# Patient Record
Sex: FEMALE | Race: WHITE | Employment: UNEMPLOYED | URBAN - METROPOLITAN AREA
[De-identification: names, ages, dates, MRNs, and addresses within clinical notes are randomized per-mention and may not be internally consistent; named-entity substitution may affect disease eponyms.]

---

## 2017-10-05 ENCOUNTER — OFFICE VISIT (OUTPATIENT)
Dept: ORTHOPEDIC SURGERY | Age: 77
End: 2017-10-05

## 2017-10-05 VITALS
HEIGHT: 59 IN | BODY MASS INDEX: 26.41 KG/M2 | HEART RATE: 91 BPM | SYSTOLIC BLOOD PRESSURE: 146 MMHG | WEIGHT: 131 LBS | DIASTOLIC BLOOD PRESSURE: 86 MMHG

## 2017-10-05 DIAGNOSIS — M25.511 RIGHT SHOULDER PAIN, UNSPECIFIED CHRONICITY: ICD-10-CM

## 2017-10-05 DIAGNOSIS — M75.101 ROTATOR CUFF TEAR ARTHROPATHY, RIGHT: Primary | ICD-10-CM

## 2017-10-05 DIAGNOSIS — M12.811 ROTATOR CUFF TEAR ARTHROPATHY, RIGHT: Primary | ICD-10-CM

## 2017-10-05 PROCEDURE — 1123F ACP DISCUSS/DSCN MKR DOCD: CPT | Performed by: ORTHOPAEDIC SURGERY

## 2017-10-05 PROCEDURE — 1090F PRES/ABSN URINE INCON ASSESS: CPT | Performed by: ORTHOPAEDIC SURGERY

## 2017-10-05 PROCEDURE — 99203 OFFICE O/P NEW LOW 30 MIN: CPT | Performed by: ORTHOPAEDIC SURGERY

## 2017-10-05 PROCEDURE — 1036F TOBACCO NON-USER: CPT | Performed by: ORTHOPAEDIC SURGERY

## 2017-10-05 PROCEDURE — G8427 DOCREV CUR MEDS BY ELIG CLIN: HCPCS | Performed by: ORTHOPAEDIC SURGERY

## 2017-10-05 PROCEDURE — 4040F PNEUMOC VAC/ADMIN/RCVD: CPT | Performed by: ORTHOPAEDIC SURGERY

## 2017-10-05 PROCEDURE — G8484 FLU IMMUNIZE NO ADMIN: HCPCS | Performed by: ORTHOPAEDIC SURGERY

## 2017-10-05 PROCEDURE — G8400 PT W/DXA NO RESULTS DOC: HCPCS | Performed by: ORTHOPAEDIC SURGERY

## 2017-10-05 PROCEDURE — G8419 CALC BMI OUT NRM PARAM NOF/U: HCPCS | Performed by: ORTHOPAEDIC SURGERY

## 2017-10-05 RX ORDER — MONTELUKAST SODIUM 10 MG/1
TABLET ORAL
COMMUNITY
Start: 2017-08-02

## 2017-10-05 RX ORDER — LEVOTHYROXINE SODIUM 88 UG/1
88 TABLET ORAL
COMMUNITY
Start: 2017-09-08

## 2017-10-05 RX ORDER — ROSUVASTATIN CALCIUM 10 MG/1
10 TABLET, COATED ORAL
COMMUNITY
Start: 2017-09-08

## 2017-10-05 NOTE — MR AVS SNAPSHOT
levothyroxine (SYNTHROID) 88 MCG tablet Take 88 mcg by mouth    montelukast (SINGULAIR) 10 MG tablet TAKE ONE TABLET BY MOUTH ONCE DAILY IN THE EVENING    rosuvastatin (CRESTOR) 10 MG tablet Take 10 mg by mouth    aspirin 81 MG tablet Take 81 mg by mouth    Cholecalciferol 2000 units CAPS Take by mouth      Allergies              Ace Inhibitors     Cough    Amoxicillin-pot Clavulanate     nervous    Atorvastatin     Constipation, dry mouth    Meloxicam     GERD    Naproxen     GERD    Phenazopyridine Nausea Only    Nabumetone Rash      We Ordered/Performed the following           XR SHOULDER RIGHT (MIN 2 VIEWS)          Result Summary for XR SHOULDER RIGHT (MIN 2 VIEWS)      Result Information     Status          Final result (Exam End: 10/5/2017 10:06 AM)           10/5/2017 10:06 AM      Narrative & Impression           Radiology exam is complete. No Radiologist dictation. Please follow up with ordering provider. Additional Information        Basic Information     Date Of Birth Sex Race Ethnicity Preferred Language    1940 Female White Non-/Non  English      Problem List as of 10/5/2017  Date Reviewed: 10/5/2017                Unspecified vitamin D deficiency    Dysphagia    Degeneration of intervertebral disc of lumbar region    Hypertension    Hypercholesterolemia      Preventive Care        Date Due    Tetanus Combination Vaccine (1 - Tdap) 1/25/1957    Cholesterol Screening 1/25/1978    Zoster Vaccine 1/25/1998    Osteoporosis screening or a bone density scan (Dexa) is recommended once at age 72. Based upon the results and risk factors for bone loss, your provider will recommend whether this needs to be repeated. 1/25/2003    Pneumococcal Vaccines (two) for all adults aged 72 and over (1 of 2 - PCV13) 1/25/2003    Yearly Flu Vaccine (1) 9/1/2017            MyChart Signup           Our records indicate that you have declined MyChart signup.

## 2017-10-05 NOTE — PROGRESS NOTES
Date:  10/5/2017    Name:  Adeel Celis  Address:  [address] [place] [address]  [id number]    :  1940      Age:   76 y.o.    SSN:  [id number]      Medical Record Number:  [id number]    Reason for Visit:    Chief Complaint    Shoulder Pain (NP right shoulder)      DOS:10/5/2017     HPI: Adeel Celis is a 76 y.o. female here today for right shoulder pain. Patient states that she has had lingering pain for the last couple of years but states that the pain got much worse this year. She denies any inciting or traumatic event that caused the pain to increase. She states that she has been under the care of her primary care physician who has been giving her cortisone injections as well as pain medications for pain control but all of these have failed to help. The patient states that she has difficulty with range of motion of her right shoulder as well as discomfort with lifting her arm above shoulder level and an inability to perform overhead activities due to limited range of motion. She comes in today for evaluation and treatment. Pain Assessment  Location of Pain: Shoulder  Location Modifiers: Right  Severity of Pain: 8  Quality of Pain: Aching, Dull  Duration of Pain: Persistent  Frequency of Pain: Constant  Aggravating Factors: Other (Comment) (raising arm, press, pull)  Limiting Behavior: Yes  Relieving Factors: Ice, Heat  Result of Injury: No  Work-Related Injury: No  Are there other pain locations you wish to document?: No  ROS: All systems reviewed on patient intake form. Pertinent items are noted in HPI. Past Medical History:   Diagnosis Date    Osteoarthritis     Thyroid disease         Past Surgical History:   Procedure Laterality Date    BACK SURGERY      CARPAL TUNNEL RELEASE Bilateral     CHOLECYSTECTOMY      HYSTERECTOMY         History reviewed. No pertinent family history.     Social History     Social History    Marital status:      Spouse name: N/A   Tata Wyman

## 2017-10-11 ENCOUNTER — TELEPHONE (OUTPATIENT)
Dept: ORTHOPEDIC SURGERY | Age: 77
End: 2017-10-11

## 2017-10-30 ENCOUNTER — TELEPHONE (OUTPATIENT)
Dept: ORTHOPEDIC SURGERY | Age: 77
End: 2017-10-30

## 2017-10-30 NOTE — ADDENDUM NOTE
Encounter addended by: Rambo Palm on: 10/30/2017  3:20 PM<BR>    Actions taken: Letter status changed

## 2017-11-07 ENCOUNTER — OFFICE VISIT (OUTPATIENT)
Dept: ORTHOPEDIC SURGERY | Age: 77
End: 2017-11-07

## 2017-11-07 VITALS
HEIGHT: 59 IN | BODY MASS INDEX: 26.4 KG/M2 | HEART RATE: 90 BPM | DIASTOLIC BLOOD PRESSURE: 85 MMHG | SYSTOLIC BLOOD PRESSURE: 130 MMHG | WEIGHT: 130.95 LBS

## 2017-11-07 DIAGNOSIS — M75.121 COMPLETE TEAR OF RIGHT ROTATOR CUFF: ICD-10-CM

## 2017-11-07 DIAGNOSIS — M25.511 RIGHT SHOULDER PAIN, UNSPECIFIED CHRONICITY: Primary | ICD-10-CM

## 2017-11-07 PROCEDURE — G8427 DOCREV CUR MEDS BY ELIG CLIN: HCPCS | Performed by: ORTHOPAEDIC SURGERY

## 2017-11-07 PROCEDURE — G8419 CALC BMI OUT NRM PARAM NOF/U: HCPCS | Performed by: ORTHOPAEDIC SURGERY

## 2017-11-07 PROCEDURE — G8400 PT W/DXA NO RESULTS DOC: HCPCS | Performed by: ORTHOPAEDIC SURGERY

## 2017-11-07 PROCEDURE — 1036F TOBACCO NON-USER: CPT | Performed by: ORTHOPAEDIC SURGERY

## 2017-11-07 PROCEDURE — 99213 OFFICE O/P EST LOW 20 MIN: CPT | Performed by: ORTHOPAEDIC SURGERY

## 2017-11-07 PROCEDURE — 1123F ACP DISCUSS/DSCN MKR DOCD: CPT | Performed by: ORTHOPAEDIC SURGERY

## 2017-11-07 PROCEDURE — 4040F PNEUMOC VAC/ADMIN/RCVD: CPT | Performed by: ORTHOPAEDIC SURGERY

## 2017-11-07 PROCEDURE — G8484 FLU IMMUNIZE NO ADMIN: HCPCS | Performed by: ORTHOPAEDIC SURGERY

## 2017-11-07 PROCEDURE — L3670 SO ACRO/CLAV CAN WEB PRE OTS: HCPCS | Performed by: ORTHOPAEDIC SURGERY

## 2017-11-07 PROCEDURE — 1090F PRES/ABSN URINE INCON ASSESS: CPT | Performed by: ORTHOPAEDIC SURGERY

## 2017-11-07 RX ORDER — M-VIT,TX,IRON,MINS/CALC/FOLIC 27MG-0.4MG
1 TABLET ORAL DAILY
COMMUNITY

## 2017-11-07 RX ORDER — CALCIUM CARBONATE 500(1250)
500 TABLET ORAL DAILY
COMMUNITY

## 2017-11-07 NOTE — PROGRESS NOTES
Chief Complaint    Shoulder Pain (Pre-op right shoulder)      History of Present Illness:  Margaret Carolina is a 76 y.o. female here today for follow-up for her right shoulder. Patient has a diagnosis of a massive rotator cuff tear and pseudoparalysis that has been treated conservatively with injections and physical therapy. Patient states the pain has progressed the point where she would like to discuss surgery. She did have a discussion with Dr. Fe Patel previously about surgical options and he has concluded that she would benefit from a reverse total shoulder replacement. At this time she would like to discuss the risks and benefits again for this procedure, including a discussion of the anticipated postoperative course. She states that her pain has worsened but her overall range of motion is about the same as it's been since her last visit. Her biggest complaint is the inability to do overhead activities due to limited range of motion and also pain. She denies any numbness tingling or paresthesias in the right upper extremity. Medical History:    Patient's medications, allergies, past medical, surgical, social and family histories were reviewed and updated as appropriate. Relevant review of systems reviewed and available in the patient's chart    Vital Signs:  Vitals:    11/07/17 1021   BP: 130/85   Pulse: 90       Physical Exam    General:  AAOx3, No acute distress     right Shoulder Examination:    Inspection:  No gross deformities or atrophy of the infraspinatus muscle    Palpation:  Significant tenderness overlying the rotator cuff footprint, bicipital groove, and before meals joint    Active Range of Motion: Forward elevation 90°, Abduction 80°, ER 30°, IR L5    Passive Range of Motion:  Forward elevation 100°, Abduction 80°    Strength:  Deferred    Special Tests:  No external rotation lag sign    Neurovascular: Normal motor and sensory function to radial, ulnar, median, and axillary exercises after surgery. In addition, the hospital perioperative course and also the postoperative course were discussed with the patient today. We will get her fitted for a DonJoy UltraSling at today's visit. All of her questions were answered to her satisfaction. We will proceed forth with surgery on November 20 after she gets medical clearance. All questions were answered and written informed consent for surgery was obtained today. Sincerely,    Melina Fournier DO  Clinical Fellow in [address] [place] [address]      This dictation was performed with a verbal recognition program [facility]) and it was checked for errors. It is possible that there are still dictated errors within this office note. If so, please bring any errors to my attention for an addendum. All efforts were made to ensure that this office note is accurate. Attestation:  I attest that my visit today with Kenny Brennan was supervised by Dr Jarrod Alexandra.  ____________  I was physically present and personally supervised the Orthopaedic Sports Medicine Fellow in the evaluation and development of a treatment plan for this patient. I personally interviewed the patient and performed a physical examination. In addition, I discussed the patient's condition and treatment options with them. I have also reviewed and agree with the past medical, family and social history unless otherwise noted. All of the patient's questions were answered. Edward Alexandra MD, PhD  11/7/2017

## 2017-11-22 ENCOUNTER — TELEPHONE (OUTPATIENT)
Dept: ORTHOPEDIC SURGERY | Age: 77
End: 2017-11-22

## 2017-11-22 NOTE — TELEPHONE ENCOUNTER
Called patient postoperatively. She is needing to take 2 tablets every for hours for now but is hopeful to go to 1 tablet by tomorrow. She has been moving her hand and wrist to keep circulation and feels comfortable in the sling. I advised that she call us back today if any other questions arise since we are going into the holiday weekend. She feels comfortable with all instructions given and will follow up with us for her first post op visit next week.

## 2017-11-28 ENCOUNTER — OFFICE VISIT (OUTPATIENT)
Dept: ORTHOPEDIC SURGERY | Age: 77
End: 2017-11-28

## 2017-11-28 VITALS
HEIGHT: 59 IN | WEIGHT: 130.95 LBS | HEART RATE: 78 BPM | SYSTOLIC BLOOD PRESSURE: 133 MMHG | DIASTOLIC BLOOD PRESSURE: 83 MMHG | BODY MASS INDEX: 26.4 KG/M2

## 2017-11-28 DIAGNOSIS — Z96.611 HISTORY OF TOTAL REPLACEMENT OF RIGHT SHOULDER JOINT: Primary | ICD-10-CM

## 2017-11-28 DIAGNOSIS — Z96.611 S/P REVERSE TOTAL SHOULDER ARTHROPLASTY, RIGHT: ICD-10-CM

## 2017-11-28 PROCEDURE — 99024 POSTOP FOLLOW-UP VISIT: CPT | Performed by: ORTHOPAEDIC SURGERY

## 2017-12-12 ENCOUNTER — OFFICE VISIT (OUTPATIENT)
Dept: ORTHOPEDIC SURGERY | Age: 77
End: 2017-12-12

## 2017-12-12 VITALS
SYSTOLIC BLOOD PRESSURE: 123 MMHG | WEIGHT: 130.95 LBS | DIASTOLIC BLOOD PRESSURE: 78 MMHG | BODY MASS INDEX: 26.4 KG/M2 | HEART RATE: 76 BPM | HEIGHT: 59 IN

## 2017-12-12 DIAGNOSIS — Z96.611 S/P REVERSE TOTAL SHOULDER ARTHROPLASTY, RIGHT: Primary | ICD-10-CM

## 2017-12-12 PROCEDURE — 99024 POSTOP FOLLOW-UP VISIT: CPT | Performed by: ORTHOPAEDIC SURGERY

## 2017-12-12 RX ORDER — HYDROCODONE BITARTRATE AND ACETAMINOPHEN 5; 325 MG/1; MG/1
TABLET ORAL
COMMUNITY
Start: 2017-11-18 | End: 2018-02-08 | Stop reason: ALTCHOICE

## 2017-12-12 NOTE — LETTER
Shoulder Elbow Rehabilitation Referral    Patient Name: Zenaida Lopez      YOB: 1940    Diagnosis:  Right reverse total shoulder replacement  Precautions:  Doing very well. Begin to wean out of sling. Post Op Instructions:   Continuous passive motion (CPM)   Elbow range of motion, active   Exercise in plane of scapula    Strengthening      Pulley and instruction    Home exercise program (copy to patient)    Sling when arm at risk   Sling or brace at all times    AAROM: Forward elevation to  90             AAROM: External rotation  To  20     Isometric external rotator strengthening  AAROM: internal rotation: up the back   Isometric abductor strengthening   AAROM: Internal abduction      Isometric internal rotator strengthening  AAROM: cross-body adduction             Stretching:     Strengthening:   Four quadrant (FE, ER, IR, CBA)   Sleeper stretch     Rotator cuff (ER, IR, Abd)   Forward Elevation     External Rotators      External Rotation     Internal Rotators   Internal Rotation: up/back    Abductors      Internal Rotation: supine in abduction   Flexors   Cross-body abduction     Extensors   Pendulum (FE, Abd/Add, cw/ccw)   Scapular Stabilizers    Wall-walking (FE, Abd)     Shoulder shrugs      Table slides       Rhomboid pinch   Elbow (flex, ext, pron, sup)     Lat.  Pull downs      Medial epicondylitis program    Forward punch    Lateral epicondylitis program    Internal rotators      Progressive resistive exercises   Bench Press         Bench press plus  Activities:      Lateral pull-downs   Rowing      Progressive two-hand supine press   Stepper/Exercise bike    Biceps: curls/supination   Swimming   Water exercises    Modalities:     Return to Sport:   Ultrasound      Plyometrics   Iontophoresis     Rhythmic stabilization   Moist heat      Core strengthening    Massage      Sports specific program:       Cryotherapy       Electrical stimulation      Paraffin   Whirlpool   TENS

## 2018-01-04 ENCOUNTER — OFFICE VISIT (OUTPATIENT)
Dept: ORTHOPEDIC SURGERY | Age: 78
End: 2018-01-04

## 2018-01-04 VITALS
DIASTOLIC BLOOD PRESSURE: 82 MMHG | HEART RATE: 88 BPM | SYSTOLIC BLOOD PRESSURE: 122 MMHG | BODY MASS INDEX: 26.4 KG/M2 | HEIGHT: 59 IN | WEIGHT: 130.95 LBS

## 2018-01-04 DIAGNOSIS — Z96.611 STATUS POST REVERSE ARTHROPLASTY OF RIGHT SHOULDER: Primary | ICD-10-CM

## 2018-01-04 PROCEDURE — 99024 POSTOP FOLLOW-UP VISIT: CPT | Performed by: ORTHOPAEDIC SURGERY

## 2018-01-04 NOTE — PROGRESS NOTES
History of Present Illness:  Karyle Littler is a pleasant 76 y.o. female accompanied by her  and presenting today approximately 6.5 weeks status post right reverse total shoulder replacement performed on 11/20/2017. She reports a pain is a 0/10 at rest and a 78/10 with activity. She continues to take hydrocodone prescribed by her rheumatologist at night for lower back pain. She reports her sleep has been good. Mrs. Verma has no concerns at this time. Medical History:  Patient's medications, allergies, past medical, surgical, social and family histories were reviewed and updated as appropriate. Pertinent items are noted in HPI  Review of systems reviewed from Patient History Form dated on 10/5/2017 and available in the patient's chart under the Media tab. Vital Signs:  Vitals:    01/04/18 0847   BP: 122/82   Pulse: 88         Constitutional: In no apparent distress. Normal affect. Alert and oriented X3 and is cooperative. Right Shoulder Examination:    Inspection: Incision well-healed. No indication of infection. Palpation: Trending tight, but otherwise smooth movement with rotation of the glenohumeral joint. Nontender to light touch. Active Range of Motion: Deferred    Passive Range of Motion: Forward elevation 75° seated position. Forward elevation in supine 115. Abduction 80° seated position. External rotation in abduction 45. Internal rotation in abduction 20. Strength:  Deferred    Special Tests:  Distally neurovascularly intact. Radiology:     Plain radiographs of the right shoulder comprising 3 views: Radiographs were obtained and reviewed in the office: AP, AP Internal, Axillary    Impression: Implant in good position without indication of loosening. Assessment :  Karyle Littler is a pleasant 76 y.o. female presenting today approximately 6.5 weeks status post right reverse total shoulder replacement performed on 11/20/2017.   Overall she is doing well and in good spirits. Impression:  Encounter Diagnosis   Name Primary?  Status post reverse arthroplasty of right shoulder Yes       Office Procedures:  Orders Placed This Encounter   Procedures    XR SHOULDER RIGHT (MIN 2 VIEWS)     X-ray Shoulder Right True AP and Axillary     Order Specific Question:   Reason for exam:     Answer:   exam    [facility]     Referral Priority:   Routine     Referral Type:   Eval and Treat     Referral Reason:   Specialty Services Required     Requested Specialty:   Physical Therapy     Number of Visits Requested:   1       Treatment Plan:  Continue in physical therapy at [facility] in [place]. A new physical therapy letter was documented in EPIC today. She may begin driving as long as she is not on pain medication during the day. She is okay to discontinue her sling expect when shoulder is at risk. We will see her back in 4 weeks and/or as needed. All questions were answered to patient's satisfaction and was encouraged to call with any further questions or concerns. Diane Oglesby is in agreement with this plan. PARISA Chacko  1/4/2018     During this examination, IGinna PA-C, functioned as a scribe for Dr. Zuly Junior. The history taking and physical examination were performed by Dr. Zuly Junior. All counseling during the appointment was performed between the patient and Dr. Zuly Junior. 1/4/2018 10:08 AM      This dictation was performed with a verbal recognition program (DRAGON) and it was checked for errors. It is possible that there are still dictated errors within this office note. If so, please bring any errors to my attention for an addendum. All efforts were made to ensure that this office note is accurate.  _____  I, Dr. Valentino Lamprey, personally performed the services described in this documentation as described by PARISA Chacko in my presence, and it is both accurate and complete. Edward Junior MD, PhD  1/4/2018

## 2018-01-04 NOTE — LETTER
TENS     Home exercise program (copy to patient). Supervised physical therapy  Frequency:   1x week   2x week   3x week   Other:   Duration:  2 weeks    4 weeks   6 weeks   Other:       Gentle manual stretching by therapist      Sincerely,          Brayden Christian MS, PA-C  Physician Assistant in [address] [place] [address]  1/4/2018       This dictation was performed with a verbal recognition program [facility]) and it was checked for errors. It is possible that there are still dictated errors within this office note. If so, please bring any errors to my attention for an addendum. All efforts were made to ensure that this office note is accurate.

## 2018-02-08 ENCOUNTER — OFFICE VISIT (OUTPATIENT)
Dept: ORTHOPEDIC SURGERY | Age: 78
End: 2018-02-08

## 2018-02-08 VITALS
BODY MASS INDEX: 26.4 KG/M2 | SYSTOLIC BLOOD PRESSURE: 133 MMHG | DIASTOLIC BLOOD PRESSURE: 84 MMHG | WEIGHT: 130.95 LBS | HEIGHT: 59 IN | HEART RATE: 81 BPM

## 2018-02-08 DIAGNOSIS — Z96.611 STATUS POST REVERSE ARTHROPLASTY OF RIGHT SHOULDER: Primary | ICD-10-CM

## 2018-02-08 PROCEDURE — 99024 POSTOP FOLLOW-UP VISIT: CPT | Performed by: ORTHOPAEDIC SURGERY

## 2018-02-08 NOTE — LETTER
Home exercise program (copy to patient). Perform exercises for:   30     minutes    3      times/day   Supervised physical therapy  Frequency:   1x week   2x week   3x week   Other:   Duration:  2 weeks    4 weeks   6 weeks   Other:     Additional Instructions:   No aggressive stretching.  May do AROM as tolerated and gentle deltoid and rotator cuff strengthening           Elizabeth Borrero MD

## 2018-02-08 NOTE — PROGRESS NOTES
History of Present Illness:  Marcelo Troy is a pleasant [de-identified] y.o. female approximately 2.5 months status post right reverse total shoulder replacement performed on 11/20/2017. She still attends physical therapy 2x week at [facility] in [place]. She also does her physical therapy 2x per day on her own at home. She has no pain when she is at rest and has no difficulty sleeping at night. She has some mild discomfort when she has physical therapy and increases her activity level. She denies any recent injury, fever, chills, or any other problems. Medical History:  Patient's medications, allergies, past medical, surgical, social and family histories were reviewed and updated as appropriate. Pertinent items are noted in HPI  Review of systems reviewed from Patient History Form dated on 10/5/17 and available in the patient's chart under the Media tab. Vital Signs:  Vitals:    02/08/18 1041   BP: 133/84   Pulse: 81         Constitutional: In no apparent distress. Normal affect. Alert and oriented X3 and is cooperative. Right Shoulder Examination:    Inspection: Normal appearing shoulder with no gross abnormalities. Well healed surgical incision with so signs of infection. Palpation: No crepitus with shoulder range of motion. Active Range of Motion: 90* Forward elevation, 10* external rotation with the elbow at the side, internal rotation to the level of back pocket     Passive Range of Motion:  110* forward elevation and pain at end range. Strength: Deltoid fires on exam. Negative lag with external rotation     Special Tests: Deferred       Radiology:     None        Assessment :  Marcelo Troy is a pleasant [de-identified] y.o. female 2.5 months status post right reverse total shoulder replacement performed on 11/20/2017. She continues to do well. Impression:  Encounter Diagnosis   Name Primary?     Status post reverse arthroplasty of right shoulder Yes       Office Procedures:  Orders Placed This Encounter   Procedures    Amb External Referral To Physical Therapy     Referral Priority:   Routine     Referral Type:   Consult for Advice and Opinion     Referral Reason:   Patient Preference     Requested Specialty:   Physical Therapy     Number of Visits Requested:   1       Treatment Plan: She is doing very well 2.5 months out from a right reverse total shoulder arthroplasty. We would like for her to continue in physical therapy at [facility] to work on active range of motion. She may progress into 1x per week and continue with her strong home exercise program. A new physical therapy letter was documented in EPIC today. We would like for her to wait a few more months to have a mammogram until she can better actively raise her arm. We will see her back in 6 weeks for repeat evaluation. All questions were answered to patient's satisfaction and was encouraged to call with any further questions or concerns. Fernando Lindsay is in agreement with this plan. During this examination, I, Myra Treviño ATC, functioned as a scribe for Dr. Tanja Delcid. The history taking and physical examination were performed by Dr. Tanja Delcid. All counseling during the appointment was performed between the patient and Dr. Tanja Delcid.   _____________  I, Dr. Lissette Mares, personally performed the services described in this documentation as described by Myra Treviño ATC in my presence, and it is both accurate and complete. Edward Delcid MD, PhD  2/8/2018

## 2018-03-22 ENCOUNTER — OFFICE VISIT (OUTPATIENT)
Dept: ORTHOPEDIC SURGERY | Age: 78
End: 2018-03-22

## 2018-03-22 VITALS
WEIGHT: 130.95 LBS | BODY MASS INDEX: 26.4 KG/M2 | HEART RATE: 77 BPM | SYSTOLIC BLOOD PRESSURE: 63 MMHG | DIASTOLIC BLOOD PRESSURE: 46 MMHG | HEIGHT: 59 IN

## 2018-03-22 DIAGNOSIS — Z96.611 STATUS POST REVERSE ARTHROPLASTY OF RIGHT SHOULDER: Primary | ICD-10-CM

## 2018-03-22 PROCEDURE — 1123F ACP DISCUSS/DSCN MKR DOCD: CPT | Performed by: ORTHOPAEDIC SURGERY

## 2018-03-22 PROCEDURE — 99213 OFFICE O/P EST LOW 20 MIN: CPT | Performed by: ORTHOPAEDIC SURGERY

## 2018-03-22 PROCEDURE — G8400 PT W/DXA NO RESULTS DOC: HCPCS | Performed by: ORTHOPAEDIC SURGERY

## 2018-03-22 PROCEDURE — G8427 DOCREV CUR MEDS BY ELIG CLIN: HCPCS | Performed by: ORTHOPAEDIC SURGERY

## 2018-03-22 PROCEDURE — 1036F TOBACCO NON-USER: CPT | Performed by: ORTHOPAEDIC SURGERY

## 2018-03-22 PROCEDURE — G8419 CALC BMI OUT NRM PARAM NOF/U: HCPCS | Performed by: ORTHOPAEDIC SURGERY

## 2018-03-22 PROCEDURE — G8484 FLU IMMUNIZE NO ADMIN: HCPCS | Performed by: ORTHOPAEDIC SURGERY

## 2018-03-22 PROCEDURE — 4040F PNEUMOC VAC/ADMIN/RCVD: CPT | Performed by: ORTHOPAEDIC SURGERY

## 2018-03-22 PROCEDURE — 1090F PRES/ABSN URINE INCON ASSESS: CPT | Performed by: ORTHOPAEDIC SURGERY

## 2018-03-22 NOTE — LETTER
Shoulder Elbow Rehabilitation Referral    Patient Name: Lizy Collins      YOB: 1940    Diagnosis: s/p right reverse total shoulder replacement    Surgery Date: 11/20/2017    Precautions: Subscapularis (D/C at 4 months)    Post Op Instructions:  [] Continuous passive motion (CPM)  [x] Elbow range of motion  [] Exercise in plane of scapula  []  Strengthening     [] Pulley and instruction   [x] Home exercise program (copy to patient)   [] Sling when arm at risk  [] Sling or brace at all times   [x] AAROM: Forward elevation to  As neel           [x] AAROM: External rotation  To  As neel    [x] Isometric external rotator strengthening [x] AAROM: internal rotation: up the back  [x] Isometric abductor strengthening  [x] AAROM: Internal abduction     [x] Isometric internal rotator strengthening [x] AAROM: cross-body adduction             Stretching:     Strengthening:   [x] Four quadrant (FE, ER, IR, CBA)  [] Sleeper stretch    [x] Rotator cuff (ER, IR, Abd)  [] Forward Elevation    [x] External Rotators     [x] External Rotation    [x] Internal Rotators  [x] Internal Rotation: up/back   [] Abductors     [x] Internal Rotation: supine in abduction [] Flexors  [x] Cross-body abduction    [x] Extensors in neutral  [] Pendulum (FE, Abd/Add, cw/ccw)  [x] Scapular Stabilizers   [x] Wall-walking (FE, Abd)    [x] Shoulder shrugs     [x] Table slides      [x] Rhomboid pinch  [x] Elbow (flex, ext, pron, sup)    [] Lat.  Pull downs     [] Medial epicondylitis program   [] Forward punch   [] Lateral epicondylitis program   [] Internal rotators     [] Progressive resistive exercises  [] Bench Press        [] Bench press plus  Activities:     [] Lateral pull-downs  [] Rowing     [x] Progressive two-hand supine press  [] Stepper/Exercise bike   [x] Biceps: curls/supination and triceps  [] Swimming  [] Water exercises    Modalities: PRN    Return to Sport:  [] Ultrasound     [] Plyometrics

## 2018-05-03 ENCOUNTER — OFFICE VISIT (OUTPATIENT)
Dept: ORTHOPEDIC SURGERY | Age: 78
End: 2018-05-03

## 2018-05-03 VITALS
WEIGHT: 130.95 LBS | HEART RATE: 66 BPM | HEIGHT: 59 IN | SYSTOLIC BLOOD PRESSURE: 138 MMHG | BODY MASS INDEX: 26.4 KG/M2 | DIASTOLIC BLOOD PRESSURE: 80 MMHG

## 2018-05-03 DIAGNOSIS — Z96.611 STATUS POST REVERSE ARTHROPLASTY OF RIGHT SHOULDER: Primary | ICD-10-CM

## 2018-05-03 DIAGNOSIS — Z96.611 S/P REVERSE TOTAL SHOULDER ARTHROPLASTY, RIGHT: ICD-10-CM

## 2018-05-03 PROCEDURE — 1123F ACP DISCUSS/DSCN MKR DOCD: CPT | Performed by: ORTHOPAEDIC SURGERY

## 2018-05-03 PROCEDURE — G8427 DOCREV CUR MEDS BY ELIG CLIN: HCPCS | Performed by: ORTHOPAEDIC SURGERY

## 2018-05-03 PROCEDURE — G8419 CALC BMI OUT NRM PARAM NOF/U: HCPCS | Performed by: ORTHOPAEDIC SURGERY

## 2018-05-03 PROCEDURE — 1090F PRES/ABSN URINE INCON ASSESS: CPT | Performed by: ORTHOPAEDIC SURGERY

## 2018-05-03 PROCEDURE — 99213 OFFICE O/P EST LOW 20 MIN: CPT | Performed by: ORTHOPAEDIC SURGERY

## 2018-05-03 PROCEDURE — 4040F PNEUMOC VAC/ADMIN/RCVD: CPT | Performed by: ORTHOPAEDIC SURGERY

## 2018-05-03 PROCEDURE — G8400 PT W/DXA NO RESULTS DOC: HCPCS | Performed by: ORTHOPAEDIC SURGERY

## 2018-05-03 PROCEDURE — 1036F TOBACCO NON-USER: CPT | Performed by: ORTHOPAEDIC SURGERY

## 2018-10-18 ENCOUNTER — OFFICE VISIT (OUTPATIENT)
Dept: ORTHOPEDIC SURGERY | Age: 78
End: 2018-10-18
Payer: MEDICARE

## 2018-10-18 VITALS
WEIGHT: 130.95 LBS | HEIGHT: 59 IN | SYSTOLIC BLOOD PRESSURE: 127 MMHG | BODY MASS INDEX: 26.4 KG/M2 | HEART RATE: 68 BPM | DIASTOLIC BLOOD PRESSURE: 76 MMHG

## 2018-10-18 DIAGNOSIS — Z96.611 STATUS POST REVERSE ARTHROPLASTY OF RIGHT SHOULDER: Primary | ICD-10-CM

## 2018-10-18 PROCEDURE — 4040F PNEUMOC VAC/ADMIN/RCVD: CPT | Performed by: ORTHOPAEDIC SURGERY

## 2018-10-18 PROCEDURE — G8400 PT W/DXA NO RESULTS DOC: HCPCS | Performed by: ORTHOPAEDIC SURGERY

## 2018-10-18 PROCEDURE — 1090F PRES/ABSN URINE INCON ASSESS: CPT | Performed by: ORTHOPAEDIC SURGERY

## 2018-10-18 PROCEDURE — G8484 FLU IMMUNIZE NO ADMIN: HCPCS | Performed by: ORTHOPAEDIC SURGERY

## 2018-10-18 PROCEDURE — 1036F TOBACCO NON-USER: CPT | Performed by: ORTHOPAEDIC SURGERY

## 2018-10-18 PROCEDURE — G8419 CALC BMI OUT NRM PARAM NOF/U: HCPCS | Performed by: ORTHOPAEDIC SURGERY

## 2018-10-18 PROCEDURE — G8427 DOCREV CUR MEDS BY ELIG CLIN: HCPCS | Performed by: ORTHOPAEDIC SURGERY

## 2018-10-18 PROCEDURE — 1101F PT FALLS ASSESS-DOCD LE1/YR: CPT | Performed by: ORTHOPAEDIC SURGERY

## 2018-10-18 PROCEDURE — 1123F ACP DISCUSS/DSCN MKR DOCD: CPT | Performed by: ORTHOPAEDIC SURGERY

## 2018-10-18 PROCEDURE — 99213 OFFICE O/P EST LOW 20 MIN: CPT | Performed by: ORTHOPAEDIC SURGERY

## 2018-10-18 NOTE — PROGRESS NOTES
Shoulder Test, were completed today. Radiology:     Plain radiographs of the RIGHT shoulder comprising 3 views: Radiographs were obtained and reviewed in the office: Axillary, AP internal, AP external    Impression: Implant in good position without indication of loosening         Assessment :  Reese Rosado is a [de-identified] y.o. female approximately one year status post right reverse total shoulder replacement on 11/20/2017. She has gone on to do well. Impression:  Encounter Diagnosis   Name Primary?  Status post reverse arthroplasty of right shoulder Yes       Office Procedures:  Orders Placed This Encounter   Procedures    XR SHOULDER RIGHT (MIN 2 VIEWS)     Order Specific Question:   Reason for exam:     Answer:   pain       Treatment Plan:  Continue activities as tolerated. Follow-up in one year for annual visit and repeat x-rays. All questions were answered to patient's satisfaction and was encouraged to call with any further questions or concerns. Reese Rosado is in agreement with this plan. Humberto Davila PA-C  10/18/2018     During this examination, I, [address], PA-C, functioned as a scribe for Dr. Magui Perez. The history taking and physical examination were performed by Dr. Magui Perez. All counseling during the appointment was performed between the patient and Dr. Magui Perez. 10/18/2018 12:13 PM      This dictation was performed with a verbal recognition program (DRAGON) and it was checked for errors. It is possible that there are still dictated errors within this office note. If so, please bring any errors to my attention for an addendum. All efforts were made to ensure that this office note is accurate.  ______________________  I, Dr. Tiesha Griggs, personally performed the services described in this documentation as described by [address], PA-C in my presence, and it is both accurate and complete. Edward Perez MD, PhD  10/18/2018

## 2019-10-10 ENCOUNTER — OFFICE VISIT (OUTPATIENT)
Dept: ORTHOPEDIC SURGERY | Age: 79
End: 2019-10-10
Payer: MEDICARE

## 2019-10-10 VITALS
BODY MASS INDEX: 26.61 KG/M2 | HEIGHT: 59 IN | SYSTOLIC BLOOD PRESSURE: 115 MMHG | DIASTOLIC BLOOD PRESSURE: 80 MMHG | HEART RATE: 80 BPM | WEIGHT: 132 LBS

## 2019-10-10 DIAGNOSIS — Z96.611 STATUS POST REVERSE ARTHROPLASTY OF RIGHT SHOULDER: Primary | ICD-10-CM

## 2019-10-10 PROCEDURE — 1036F TOBACCO NON-USER: CPT | Performed by: ORTHOPAEDIC SURGERY

## 2019-10-10 PROCEDURE — G8400 PT W/DXA NO RESULTS DOC: HCPCS | Performed by: ORTHOPAEDIC SURGERY

## 2019-10-10 PROCEDURE — 1090F PRES/ABSN URINE INCON ASSESS: CPT | Performed by: ORTHOPAEDIC SURGERY

## 2019-10-10 PROCEDURE — G8427 DOCREV CUR MEDS BY ELIG CLIN: HCPCS | Performed by: ORTHOPAEDIC SURGERY

## 2019-10-10 PROCEDURE — G8484 FLU IMMUNIZE NO ADMIN: HCPCS | Performed by: ORTHOPAEDIC SURGERY

## 2019-10-10 PROCEDURE — 4040F PNEUMOC VAC/ADMIN/RCVD: CPT | Performed by: ORTHOPAEDIC SURGERY

## 2019-10-10 PROCEDURE — 1123F ACP DISCUSS/DSCN MKR DOCD: CPT | Performed by: ORTHOPAEDIC SURGERY

## 2019-10-10 PROCEDURE — G8419 CALC BMI OUT NRM PARAM NOF/U: HCPCS | Performed by: ORTHOPAEDIC SURGERY

## 2019-10-10 PROCEDURE — 99213 OFFICE O/P EST LOW 20 MIN: CPT | Performed by: ORTHOPAEDIC SURGERY

## 2019-10-10 RX ORDER — HYDROCODONE BITARTRATE AND ACETAMINOPHEN 5; 325 MG/1; MG/1
TABLET ORAL
Refills: 0 | COMMUNITY
Start: 2019-08-12

## 2019-10-10 RX ORDER — CETIRIZINE HYDROCHLORIDE 10 MG/1
TABLET ORAL
Refills: 0 | COMMUNITY
Start: 2019-09-04

## 2019-10-10 RX ORDER — CHLORHEXIDINE GLUCONATE 0.12 MG/ML
RINSE ORAL
COMMUNITY

## 2020-10-27 ENCOUNTER — OFFICE VISIT (OUTPATIENT)
Dept: ORTHOPEDIC SURGERY | Age: 80
End: 2020-10-27
Payer: MEDICARE

## 2020-10-27 VITALS — BODY MASS INDEX: 26.61 KG/M2 | HEIGHT: 59 IN | WEIGHT: 132 LBS

## 2020-10-27 PROCEDURE — 99213 OFFICE O/P EST LOW 20 MIN: CPT | Performed by: ORTHOPAEDIC SURGERY

## 2020-10-27 PROCEDURE — G8400 PT W/DXA NO RESULTS DOC: HCPCS | Performed by: ORTHOPAEDIC SURGERY

## 2020-10-27 PROCEDURE — 20610 DRAIN/INJ JOINT/BURSA W/O US: CPT | Performed by: ORTHOPAEDIC SURGERY

## 2020-10-27 PROCEDURE — G8417 CALC BMI ABV UP PARAM F/U: HCPCS | Performed by: ORTHOPAEDIC SURGERY

## 2020-10-27 PROCEDURE — G8484 FLU IMMUNIZE NO ADMIN: HCPCS | Performed by: ORTHOPAEDIC SURGERY

## 2020-10-27 PROCEDURE — 1036F TOBACCO NON-USER: CPT | Performed by: ORTHOPAEDIC SURGERY

## 2020-10-27 PROCEDURE — 1123F ACP DISCUSS/DSCN MKR DOCD: CPT | Performed by: ORTHOPAEDIC SURGERY

## 2020-10-27 PROCEDURE — 1090F PRES/ABSN URINE INCON ASSESS: CPT | Performed by: ORTHOPAEDIC SURGERY

## 2020-10-27 PROCEDURE — 4040F PNEUMOC VAC/ADMIN/RCVD: CPT | Performed by: ORTHOPAEDIC SURGERY

## 2020-10-27 PROCEDURE — G8427 DOCREV CUR MEDS BY ELIG CLIN: HCPCS | Performed by: ORTHOPAEDIC SURGERY

## 2020-10-27 RX ORDER — FLUTICASONE PROPIONATE 50 MCG
SPRAY, SUSPENSION (ML) NASAL
COMMUNITY
Start: 2020-07-30

## 2020-10-27 RX ORDER — METHYLPREDNISOLONE ACETATE 40 MG/ML
40 INJECTION, SUSPENSION INTRA-ARTICULAR; INTRALESIONAL; INTRAMUSCULAR; SOFT TISSUE ONCE
Status: COMPLETED | OUTPATIENT
Start: 2020-10-27 | End: 2020-10-27

## 2020-10-27 RX ORDER — LIDOCAINE HYDROCHLORIDE 10 MG/ML
1 INJECTION, SOLUTION INFILTRATION; PERINEURAL ONCE
Status: COMPLETED | OUTPATIENT
Start: 2020-10-27 | End: 2020-10-27

## 2020-10-27 RX ADMIN — METHYLPREDNISOLONE ACETATE 40 MG: 40 INJECTION, SUSPENSION INTRA-ARTICULAR; INTRALESIONAL; INTRAMUSCULAR; SOFT TISSUE at 12:16

## 2020-10-27 RX ADMIN — LIDOCAINE HYDROCHLORIDE 1 ML: 10 INJECTION, SOLUTION INFILTRATION; PERINEURAL at 12:14

## 2020-10-27 ASSESSMENT — ENCOUNTER SYMPTOMS: BACK PAIN: 1

## 2020-10-27 NOTE — PROGRESS NOTES
Jesse Ferrera [address] [facility]  Office Visit  New Patient  Date:  10/27/2020    Name:  Cayden Sherman  Address:  [address] [place] [id number]    :  1940      Age:   78 y.o.    SSN:  [id number]      Medical Record Number:  <[id number]>    Chief Complaint:    Right shoulder pain    HPI:   Cayden Sherman is a 78 y.o. female who presents for evaluation of her right shoulder. She is 3 years status post right reverse shoulder arthroplasty. She states she is doing very well however she has some pinpoint pain over her acromioclavicular joint. Overall she is very satisfied with her surgery. .     Pain Assessment  Location of Pain: Shoulder  Location Modifiers: Right  Severity of Pain: 4  Quality of Pain: Aching  Duration of Pain: Persistent  Frequency of Pain: Intermittent  Aggravating Factors: (nighttime, certain movements)  Limiting Behavior: Yes  Work-Related Injury: No  Are there other pain locations you wish to document?: No    Past History:  Past Medical History:   Diagnosis Date    Osteoarthritis     Thyroid disease        Past Surgical History:   Procedure Laterality Date    BACK SURGERY      CARPAL TUNNEL RELEASE Bilateral     CHOLECYSTECTOMY      HYSTERECTOMY      JOINT REPLACEMENT      SHOULDER SURGERY Right 10/20/2017    reverse TSA       Social History     Tobacco Use    Smoking status: Never Smoker    Smokeless tobacco: Never Used   Substance Use Topics    Alcohol use: No    Drug use: No        Family History:  family history is not on file.          Current Outpatient Medications:     fluticasone (FLONASE) 50 MCG/ACT nasal spray, USE 1 SPRAY(S) IN EACH NOSTRIL ONCE DAILY, Disp: , Rfl:     cetirizine (ZYRTEC) 10 MG tablet, TAKE 1 TABLET BY MOUTH ONCE DAILY, Disp: , Rfl: 0    chlorhexidine (PERIDEX) 0.12 % solution, Take by mouth, Disp: , Rfl:     VOLTAREN 1 % GEL, , Disp: , Rfl:     HYDROcodone-acetaminophen (NORCO) 5-325 MG per tablet, TAKE 1 TABLET BY MOUTH THREE TIMES DAILY AS NEEDED MUST LAST 30 DAYS, Disp: , Rfl: 0    Multiple Vitamins-Minerals (THERAPEUTIC MULTIVITAMIN-MINERALS) tablet, Take 1 tablet by mouth daily, Disp: , Rfl:     calcium carbonate (OSCAL) 500 MG TABS tablet, Take 500 mg by mouth daily, Disp: , Rfl:     levothyroxine (SYNTHROID) 88 MCG tablet, Take 88 mcg by mouth, Disp: , Rfl:     montelukast (SINGULAIR) 10 MG tablet, TAKE ONE TABLET BY MOUTH ONCE DAILY IN THE EVENING, Disp: , Rfl:     rosuvastatin (CRESTOR) 10 MG tablet, Take 10 mg by mouth, Disp: , Rfl:     aspirin 81 MG tablet, Take 81 mg by mouth, Disp: , Rfl:     Cholecalciferol 2000 units CAPS, Take by mouth, Disp: , Rfl:       Allergies   Allergen Reactions    Ace Inhibitors      Cough    Amoxicillin-Pot Clavulanate      nervous    Atorvastatin      Constipation, dry mouth    Meloxicam      GERD    Naproxen      GERD    Phenazopyridine Nausea Only    Sertraline Other (See Comments)     insomnia    Nabumetone Rash         Review of Systems: A 14 point review of systems available in the scanned medical record as documented by the patient on 10/27/2020. Today's review pertinent items are noted in HPI. No notes on file    Physical Exam:  Ht 4' 11\" (1.499 m)   Wt 132 lb (59.9 kg)   BMI 26.66 kg/m²      5 free items - Need 1 point for level 3 vs 7 more for level 4    General: No acute distress, well nourished  CV: No obvious peripheral edema. Normal peripheral pulses  Neuro: Alert & oriented x 3  Psych: Normal mood and affect    Right shoulder exam  See incisions well-healed there is no redness or erythema no signs of infection. She has 130 degrees of forward elevation, external rotation 25 degrees, internal rotation to L5. There is no instability is nervous intact distally she has point tenderness over the acromioclavicular joint. No tenderness over the scapular spine.     Left shoulder: 145 degrees forward elevation external rotation 45 degrees, internal rotation to L4. Skin is intact no deformity. No joint instability nv intact distally      Radiographic:  X-rays obtained and reviewed in office, reviewed and interpreted by me today:  Views: 3  Location: Right shoulder  Impression: 3 views the right shoulder demonstrates status post reverse total shoulder arthroplasty no complications. She has severe acromioclavicular joint osteoarthritis    Assessment:  Brett Manzano is a 78 y.o. female with:  1. 3 years status post right reverse total shoulder arthroplasty doing well  2. Severe acromioclavicular joint arthritis    Impression:  Encounter Diagnosis   Name Primary?  Status post reverse arthroplasty of right shoulder Yes       Office Procedures:  Orders Placed This Encounter   Procedures    XR SHOULDER RIGHT (MIN 2 VIEWS)     Standing Status:   Future     Number of Occurrences:   1     Standing Expiration Date:   10/27/2021     Order Specific Question:   Reason for exam:     Answer:   F/U       Plan:   Overall she doing very well from her surgery. She does have point tenderness to the acromioclavicular joint. We did a diagnostic and therapeutic injection in the acromioclavicular joint today. She tolerated the procedure well. Would like to hear back from her in about 6 weeks to see how she is doing. Other than that she can follow-up in 2 years for radiographs of the right shoulder. Procedure Note:  After discussing the risks and benefits of a corticosteroid injection, Vonzella Brunner did state an understanding and gave verbal consent to proceed. After cleansing the injection site with Chlora-prep and using aseptic techniques,  1 CC of Depo Medrol 40mg/ml and 1 CC of 1% lidocaine were injected in the right A/C joint. She  tolerated the procedure well with no immediate adverse sequelae after the injection. A bandage was placed over the injection site. Appropriate post injections instructions were given to the patient.     [address] [place] [address] Shoulder Elbow Fellow    The encounter with Haydee De Leon was supervised by Dr Martina Romo who personally examined the patient and reviewed the plan. This dictation was performed with a verbal recognition program (DRAGON) and it was checked for errors. It is possible that there are still dictated errors within this office note. If so, please bring any errors to my attention for an addendum. All efforts were made to ensure that this office note is accurate.   __________________  I was physically present and personally supervised the Orthopaedic Sports Medicine Fellow in the evaluation and development of a treatment plan for this patient. I personally interviewed the patient and performed a physical examination. In addition, I discussed the patient's condition and treatment options with them. I have also reviewed and agree with the past medical, family and social history unless otherwise noted. All of the patient's questions were answered. Edward Romo MD, PhD  10/27/2020

## 2021-01-21 ENCOUNTER — TELEPHONE (OUTPATIENT)
Dept: ORTHOPEDIC SURGERY | Age: 81
End: 2021-01-21

## 2021-01-21 NOTE — TELEPHONE ENCOUNTER
BILLING TOLD HER TO CALL OFFICE ABOUT INJ SHE WAS BILLED FOR THAT SHE SAYS SHE DIDN'T GET PLEASE Marianela Walden [phone]

## 2021-05-03 NOTE — PROGRESS NOTES
History of Present Illness:  Brooke Flores is a pleasant 76 y.o. female accompanying by her  and presenting today approximately 8 days status post right reverse total shoulder replacement performed on 11/20/2017. Her pain is a 59/10 and would like to discontinue her Percocet and begin taking hydrocodone. Overall she is doing well and has no major concerns at this time. She denies any fevers, chills, and/or flulike symptoms. Medical History:  Patient's medications, allergies, past medical, surgical, social and family histories were reviewed and updated as appropriate. Pertinent items are noted in HPI  Review of systems reviewed from Patient History Form dated on 10/5/2017 and available in the patient's chart under the Media tab. Vital Signs:  Vitals:    11/28/17 0912   BP: 133/83   Pulse: 78         Constitutional: In no apparent distress. Normal affect. Alert and oriented X3 and is cooperative. Right Shoulder Examination:    Inspection: Prineo dressing intact. No indication of infection. No drainage from incision site. No diffuse erythema around incision site. Palpation:  Nontender to light touch    Active Range of Motion: Deferred    Passive Range of Motion:  Deferred    Strength:  Deferred    Special Tests:  Distally neurovascularly intact      Radiology:     Plain radiographs of the right shoulder comprising 3 views: Radiographs were obtained and reviewed in the office: AP internal, AP external, axillary    Impression:Reverse total shoulder replacement with all components in good position. Assessment :  Brooke Flores is a pleasant 76 y.o. female presenting today approximately 8 days status post right shoulder reverse total shoulder replacement performed on 11/20/2017. Impression:  Encounter Diagnoses   Name Primary?     History of total replacement of right shoulder joint Yes    S/P reverse total shoulder arthroplasty, right        Office Procedures:  Orders Placed This Encounter   Procedures    XR SHOULDER RIGHT (MIN 2 VIEWS)     X-ray Shoulder Right True AP and Axillary     Order Specific Question:   Reason for exam:     Answer:   exam    External Referral To Physical Therapy     Referral Priority:   Routine     Referral Type:   Consult for Advice and Opinion     Referral Reason:   Specialty Services Required     Requested Specialty:   Physical Therapy     Number of Visits Requested:   1       Treatment Plan:  Begin physical therapy physical therapy at [facility] in in [place], [place]. A new physical therapy letter was documented in EPIC today and provided to the patient. We will see her back in 2 weeks and/or as needed. All questions were answered to patient's satisfaction and was encouraged to call with any further questions or concerns. Anibal Irwin is in agreement with this plan. Humberto Davila PA-C  11/28/2017     During this examination, I, [address], PA-C, functioned as a scribe for Dr. David Shahid. The history taking and physical examination were performed by Dr. David Shahid. All counseling during the appointment was performed between the patient and Dr. David Shahid. 11/28/2017 12:13 PM      This dictation was performed with a verbal recognition program (DRAGON) and it was checked for errors. It is possible that there are still dictated errors within this office note. If so, please bring any errors to my attention for an addendum. All efforts were made to ensure that this office note is accurate.  ____________  I, Dr. Yahaira Francis, personally performed the services described in this documentation as described by [address], PA-C in my presence, and it is both accurate and complete. Edward Shahid MD, PhD  11/28/2017 165.1